# Patient Record
Sex: FEMALE | Race: WHITE | NOT HISPANIC OR LATINO
[De-identification: names, ages, dates, MRNs, and addresses within clinical notes are randomized per-mention and may not be internally consistent; named-entity substitution may affect disease eponyms.]

---

## 2021-01-13 ENCOUNTER — APPOINTMENT (OUTPATIENT)
Dept: FAMILY MEDICINE | Facility: CLINIC | Age: 21
End: 2021-01-13
Payer: COMMERCIAL

## 2021-01-13 DIAGNOSIS — Z78.9 OTHER SPECIFIED HEALTH STATUS: ICD-10-CM

## 2021-01-13 LAB — CYTOLOGY CVX/VAG DOC THIN PREP: NORMAL

## 2021-01-13 PROCEDURE — 99203 OFFICE O/P NEW LOW 30 MIN: CPT | Mod: 95

## 2021-01-13 RX ORDER — NORETHINDRONE ACETATE AND ETHINYL ESTRADIOL, ETHINYL ESTRADIOL AND FERROUS FUMARATE 1MG-10(24)
1 MG-10 MCG / KIT ORAL DAILY
Refills: 0 | Status: ACTIVE | COMMUNITY
Start: 2021-01-13

## 2021-01-13 NOTE — PLAN
[FreeTextEntry1] : \par est care\par labs to be done at lab for cpe\par covid ab desired\par desires std/hiv/ hepc testing\par \par cold sores\par herpes abs- hsv vs canker sores?\par warm salt water gargles\par advised if she has herpes she can spread to others lips / mouth through kissing and genital area through oral sex \par trial of valtrex next time it occurs\par \par intermittent rash fungal vs dermatitis?\par trial of clotrimaozle- betamethasone cream bid x 2 weeks maximum\par avoid new soaps/lotions/detergents/ deodorants\par \par office will mail lab script \par will f/u for cpe\par \par f/u 3 days if no relief, pt agreed w/plan\par \par

## 2021-01-13 NOTE — HISTORY OF PRESENT ILLNESS
[Home] : at home, [unfilled] , at the time of the visit. [Other Location: e.g. Home (Enter Location, City,State)___] : at [unfilled] [Verbal consent obtained from patient] : the patient, [unfilled] [FreeTextEntry1] : cold sores and rash and est care  [de-identified] : 21yo F presents to [facility], c/o cold sores and intermittent rash\par \par she was exposed to 2 people with covid and she was negative after 4th day of being exposed and will get tested again on friday on day 7\par she was exposed last friday\par \par \par c/o cold sores on tongue but not on lips or inside of cheeks\par she said this is intermittent and began in december recently but the first time it occurred was dec 2019 \par she said it usually happens during stressful times but this time they are lasting longer \par denies discharge or bleeding from sores\par \par since march she has an intermittent rash under her arm pits\par it is itching and burning and worse after a long day\par rash is bumpy and red and she has nothing \par she changed to an unscented natural deodorant but it didn’t change anything \par denies new lotions, detergents or soaps\par she used to use a wash cloth to clean herself but now she uses just a bar of soap bc rough materials make it worse she says\par when it first occurred she used her boyfriends deodorant and she never used it again and that is when it first started \par

## 2021-01-13 NOTE — PHYSICAL EXAM
[Normal] : no acute distress, well nourished, well developed and well-appearing [Normal Sclera/Conjunctiva] : normal sclera/conjunctiva [EOMI] : extraocular movements intact [Normal Outer Ear/Nose] : the outer ears and nose were normal in appearance [Normal Oropharynx] : the oropharynx was normal [No Rash] : no rash [No Focal Deficits] : no focal deficits [Normal Affect] : the affect was normal [Normal Mood] : the mood was normal [Normal Insight/Judgement] : insight and judgment were intact [de-identified] : no swelling in neck as per patient palpating [de-identified] : no tongue lesions or lip/mouth lesions  [de-identified] : normal chest rise with inhalation and chest fall with exhalation \par \par  [de-identified] : n

## 2021-01-13 NOTE — HEALTH RISK ASSESSMENT
[Patient reported PAP Smear was normal] : Patient reported PAP Smear was normal [HIV Test offered] : HIV Test offered [Hepatitis C test offered] : Hepatitis C test offered [PapSmearDate] : 8/2020 [PapSmearComments] : [facility] gynecology in [place]

## 2021-01-13 NOTE — REVIEW OF SYSTEMS
[Fever] : no fever [Chills] : no chills [Chest Pain] : no chest pain [Palpitations] : no palpitations [Shortness Of Breath] : no shortness of breath [Cough] : no cough [de-identified] : intermittent rash and itching and cold sores on tongue

## 2021-02-10 ENCOUNTER — TRANSCRIPTION ENCOUNTER (OUTPATIENT)
Age: 21
End: 2021-02-10

## 2021-02-12 ENCOUNTER — TRANSCRIPTION ENCOUNTER (OUTPATIENT)
Age: 21
End: 2021-02-12

## 2021-02-13 ENCOUNTER — TRANSCRIPTION ENCOUNTER (OUTPATIENT)
Age: 21
End: 2021-02-13

## 2021-02-16 ENCOUNTER — TRANSCRIPTION ENCOUNTER (OUTPATIENT)
Age: 21
End: 2021-02-16

## 2021-02-16 DIAGNOSIS — Z11.59 ENCOUNTER FOR SCREENING FOR OTHER VIRAL DISEASES: ICD-10-CM

## 2021-02-16 DIAGNOSIS — Z11.4 ENCOUNTER FOR SCREENING FOR HUMAN IMMUNODEFICIENCY VIRUS [HIV]: ICD-10-CM

## 2021-02-16 DIAGNOSIS — Z11.3 ENCOUNTER FOR SCREENING FOR INFECTIONS WITH A PREDOMINANTLY SEXUAL MODE OF TRANSMISSION: ICD-10-CM

## 2021-02-16 DIAGNOSIS — Z13.1 ENCOUNTER FOR SCREENING FOR DIABETES MELLITUS: ICD-10-CM

## 2021-02-16 DIAGNOSIS — Z01.83 ENCOUNTER FOR BLOOD TYPING: ICD-10-CM

## 2021-02-16 DIAGNOSIS — Z13.228 ENCOUNTER FOR SCREENING FOR OTHER METABOLIC DISORDERS: ICD-10-CM

## 2021-02-16 DIAGNOSIS — Z13.220 ENCOUNTER FOR SCREENING FOR LIPOID DISORDERS: ICD-10-CM

## 2021-02-16 DIAGNOSIS — Z76.89 PERSONS ENCOUNTERING HEALTH SERVICES IN OTHER SPECIFIED CIRCUMSTANCES: ICD-10-CM

## 2021-06-29 ENCOUNTER — APPOINTMENT (OUTPATIENT)
Dept: FAMILY MEDICINE | Facility: CLINIC | Age: 21
End: 2021-06-29
Payer: COMMERCIAL

## 2021-06-29 VITALS
BODY MASS INDEX: 27.6 KG/M2 | SYSTOLIC BLOOD PRESSURE: 116 MMHG | OXYGEN SATURATION: 98 % | DIASTOLIC BLOOD PRESSURE: 80 MMHG | RESPIRATION RATE: 12 BRPM | HEART RATE: 62 BPM | HEIGHT: 62 IN | WEIGHT: 150 LBS

## 2021-06-29 VITALS — TEMPERATURE: 98.3 F

## 2021-06-29 DIAGNOSIS — R21 RASH AND OTHER NONSPECIFIC SKIN ERUPTION: ICD-10-CM

## 2021-06-29 DIAGNOSIS — Z86.19 PERSONAL HISTORY OF OTHER INFECTIOUS AND PARASITIC DISEASES: ICD-10-CM

## 2021-06-29 PROCEDURE — G0444 DEPRESSION SCREEN ANNUAL: CPT | Mod: 59

## 2021-06-29 PROCEDURE — G0442 ANNUAL ALCOHOL SCREEN 15 MIN: CPT

## 2021-06-29 PROCEDURE — 99213 OFFICE O/P EST LOW 20 MIN: CPT | Mod: 25

## 2021-06-29 RX ORDER — CLOTRIMAZOLE AND BETAMETHASONE DIPROPIONATE 10; .5 MG/G; MG/G
1-0.05 CREAM TOPICAL TWICE DAILY
Qty: 1 | Refills: 1 | Status: DISCONTINUED | COMMUNITY
Start: 2021-01-13 | End: 2021-06-29

## 2021-06-29 RX ORDER — MULTIVITAMIN
TABLET ORAL
Refills: 0 | Status: ACTIVE | COMMUNITY

## 2021-06-29 NOTE — PLAN
[FreeTextEntry1] : she was advised to use the chlorhexidine rinse and to see her dentist for a cleaning and to follow up with the orthodontist to see if her retainer needs to be adjusted, the lab report from February was reviewed

## 2021-06-29 NOTE — PHYSICAL EXAM
[No Acute Distress] : no acute distress [Well Nourished] : well nourished [Well Developed] : well developed [Well-Appearing] : well-appearing [Normal Voice/Communication] : normal voice/communication [No Respiratory Distress] : no respiratory distress  [Normal Mood] : the mood was normal [Coordination Grossly Intact] : coordination grossly intact [de-identified] : erythema on buccal surface of gums behind teeth on the sides, no other lesions seen

## 2021-06-29 NOTE — HISTORY OF PRESENT ILLNESS
[FreeTextEntry8] : pt c/o blisters in mouth x on and off  1 year.  For the past year or so she has recurring sores inside of her mouth especially on the roof of her mouth and behind her teeth.  She doesn't get any sores or blisters on her lips.  She saw the oral surgeon and at that time she had paresh funez and she needed her wisdom teeth removed urgently.  They prescribed a numbing mouthwash that she uses now and then.  She hasn't had any unusual weight loss.  She has tried valtrex without improvement. She does wear a retainer nightly, she cleans it often

## 2022-01-20 ENCOUNTER — APPOINTMENT (OUTPATIENT)
Dept: FAMILY MEDICINE | Facility: CLINIC | Age: 22
End: 2022-01-20
Payer: COMMERCIAL

## 2022-01-20 VITALS
TEMPERATURE: 97.1 F | OXYGEN SATURATION: 98 % | DIASTOLIC BLOOD PRESSURE: 70 MMHG | RESPIRATION RATE: 12 BRPM | WEIGHT: 150 LBS | SYSTOLIC BLOOD PRESSURE: 108 MMHG | BODY MASS INDEX: 27.6 KG/M2 | HEART RATE: 72 BPM | HEIGHT: 62 IN

## 2022-01-20 DIAGNOSIS — Z13.220 ENCOUNTER FOR SCREENING FOR LIPOID DISORDERS: ICD-10-CM

## 2022-01-20 DIAGNOSIS — Z13.29 ENCOUNTER FOR SCREENING FOR OTHER SUSPECTED ENDOCRINE DISORDER: ICD-10-CM

## 2022-01-20 DIAGNOSIS — Z13.0 ENCOUNTER FOR SCREENING FOR DISEASES OF THE BLOOD AND BLOOD-FORMING ORGANS AND CERTAIN DISORDERS INVOLVING THE IMMUNE MECHANISM: ICD-10-CM

## 2022-01-20 LAB
BILIRUB UR QL STRIP: NEGATIVE
GLUCOSE UR-MCNC: NORMAL
HCG UR QL: 0.2 EU/DL
HCG UR QL: NEGATIVE
HGB UR QL STRIP.AUTO: NEGATIVE
KETONES UR-MCNC: NEGATIVE
LEUKOCYTE ESTERASE UR QL STRIP: NEGATIVE
NITRITE UR QL STRIP: NEGATIVE
PH UR STRIP: 8
PROT UR STRIP-MCNC: NEGATIVE
QUALITY CONTROL: YES
SP GR UR STRIP: 1.01

## 2022-01-20 PROCEDURE — 81025 URINE PREGNANCY TEST: CPT

## 2022-01-20 PROCEDURE — 99395 PREV VISIT EST AGE 18-39: CPT | Mod: 25

## 2022-01-20 PROCEDURE — G0444 DEPRESSION SCREEN ANNUAL: CPT | Mod: 59

## 2022-01-20 PROCEDURE — 81003 URINALYSIS AUTO W/O SCOPE: CPT | Mod: QW

## 2022-01-20 PROCEDURE — 36415 COLL VENOUS BLD VENIPUNCTURE: CPT

## 2022-01-20 NOTE — PLAN
[FreeTextEntry1] : 21 yr old female CPE \par \par 1. Nausea: \par in office u/a normal \par in office preg test neg \par given script for abd US due to c/o and abdominal discomfort \par \par Normal physical examination and vital signs \par continue all medications as directed \par f/u with GYN as directed \par \par Routine lab work today to screen for anemia, CAD, liver and kidney abnormalities, and thyroid disorders (CBC, CMP, lipid, TSH)\par \par RTO as routine for follow up.\par

## 2022-01-20 NOTE — COUNSELING
[Engage in a relaxing activity] : Engage in a relaxing activity [Plan in advance] : Plan in advance [Encouraged to increase physical activity] : Encouraged to increase physical activity Health Care Proxy (HCP)

## 2022-01-20 NOTE — HISTORY OF PRESENT ILLNESS
[FreeTextEntry1] : pt presents for cpe  [de-identified] : 21 yr old female is here for physical examination. Admits to 2 months of feeling nauseous. She took Plan B in November. Denies fever, chills, vomiting.

## 2022-01-20 NOTE — REVIEW OF SYSTEMS
[Abdominal Pain] : no abdominal pain [Nausea] : nausea [Constipation] : no constipation [Diarrhea] : diarrhea [Vomiting] : no vomiting [Heartburn] : no heartburn [Melena] : no melena [Dysuria] : no dysuria [Incontinence] : no incontinence [Nocturia] : no nocturia [Poor Libido] : libido not poor [Hematuria] : no hematuria [Frequency] : no frequency [Vaginal Discharge] : no vaginal discharge [Dysmenorrhea] : no dysmenorrhea [Negative] : Heme/Lymph [FreeTextEntry8] : suprapubic pain

## 2022-01-20 NOTE — HEALTH RISK ASSESSMENT
[Excellent] : ~his/her~  mood as  excellent [Never] : Never [No] : In the past 12 months have you used drugs other than those required for medical reasons? No [No falls in past year] : Patient reported no falls in the past year [1] : 2) Feeling down, depressed, or hopeless for several days (1) [PHQ-2 Negative - No further assessment needed] : PHQ-2 Negative - No further assessment needed [Patient reported PAP Smear was normal] : Patient reported PAP Smear was normal [None] : None [With Family] : lives with family [Employed] : employed [College] : College [Sexually Active] : sexually active [Feels Safe at Home] : Feels safe at home [Fully functional (bathing, dressing, toileting, transferring, walking, feeding)] : Fully functional (bathing, dressing, toileting, transferring, walking, feeding) [Fully functional (using the telephone, shopping, preparing meals, housekeeping, doing laundry, using] : Fully functional and needs no help or supervision to perform IADLs (using the telephone, shopping, preparing meals, housekeeping, doing laundry, using transportation, managing medications and managing finances) [Smoke Detector] : smoke detector [Carbon Monoxide Detector] : carbon monoxide detector [Seat Belt] :  uses seat belt [Sunscreen] : uses sunscreen [FreeTextEntry1] : nauseous   [de-identified] : none [de-identified] : GYN  [de-identified] : cardio  [de-identified] : regular  [[id number]] : 2 [Change in mental status noted] : No change in mental status noted [Reports changes in hearing] : Reports no changes in hearing [Reports changes in vision] : Reports no changes in vision [Reports normal functional visual acuity (ie: able to read med bottle)] : Reports poor functional visual acuity.  [Reports changes in dental health] : Reports no changes in dental health [PapSmearDate] : 2020 [AdvancecareDate] : 1/20/22

## 2022-01-20 NOTE — PHYSICAL EXAM
[No Acute Distress] : no acute distress [Well Nourished] : well nourished [PERRL] : pupils equal round and reactive to light [EOMI] : extraocular movements intact [Normal Outer Ear/Nose] : the outer ears and nose were normal in appearance [Normal TMs] : both tympanic membranes were normal [No JVD] : no jugular venous distention [No Lymphadenopathy] : no lymphadenopathy [Supple] : supple [Thyroid Normal, No Nodules] : the thyroid was normal and there were no nodules present [No Respiratory Distress] : no respiratory distress  [No Accessory Muscle Use] : no accessory muscle use [Clear to Auscultation] : lungs were clear to auscultation bilaterally [Normal Rate] : normal rate  [Regular Rhythm] : with a regular rhythm [Normal S1, S2] : normal S1 and S2 [No Murmur] : no murmur heard [No Carotid Bruits] : no carotid bruits [No Edema] : there was no peripheral edema [No Extremity Clubbing/Cyanosis] : no extremity clubbing/cyanosis [Soft] : abdomen soft [Non Tender] : non-tender [Non-distended] : non-distended [No HSM] : no HSM [Normal Bowel Sounds] : normal bowel sounds [Normal Posterior Cervical Nodes] : no posterior cervical lymphadenopathy [Normal Anterior Cervical Nodes] : no anterior cervical lymphadenopathy [No Spinal Tenderness] : no spinal tenderness [Kyphosis] : no kyphosis [Scoliosis] : no scoliosis [No Joint Swelling] : no joint swelling [Grossly Normal Strength/Tone] : grossly normal strength/tone [No Rash] : no rash [No Skin Lesions] : no skin lesions [Acne] : no acne [Coordination Grossly Intact] : coordination grossly intact [No Focal Deficits] : no focal deficits [Normal Gait] : normal gait [Deep Tendon Reflexes (DTR)] : deep tendon reflexes were 2+ and symmetric [Normal Affect] : the affect was normal [Alert and Oriented x3] : oriented to person, place, and time [Normal Insight/Judgement] : insight and judgment were intact

## 2022-01-21 LAB
ALBUMIN SERPL ELPH-MCNC: 4.9 G/DL
ALP BLD-CCNC: 43 U/L
ALT SERPL-CCNC: 17 U/L
ANION GAP SERPL CALC-SCNC: 14 MMOL/L
AST SERPL-CCNC: 22 U/L
BASOPHILS # BLD AUTO: 0.06 K/UL
BASOPHILS NFR BLD AUTO: 0.9 %
BILIRUB SERPL-MCNC: 0.3 MG/DL
BUN SERPL-MCNC: 16 MG/DL
CALCIUM SERPL-MCNC: 10 MG/DL
CHLORIDE SERPL-SCNC: 102 MMOL/L
CHOLEST SERPL-MCNC: 192 MG/DL
CO2 SERPL-SCNC: 24 MMOL/L
CREAT SERPL-MCNC: 1.06 MG/DL
EOSINOPHIL # BLD AUTO: 0.06 K/UL
EOSINOPHIL NFR BLD AUTO: 0.9 %
GLUCOSE SERPL-MCNC: 94 MG/DL
HCG SERPL-MCNC: <1 MIU/ML
HCT VFR BLD CALC: 40.5 %
HDLC SERPL-MCNC: 78 MG/DL
HGB BLD-MCNC: 13.7 G/DL
IMM GRANULOCYTES NFR BLD AUTO: 0.1 %
LDLC SERPL CALC-MCNC: 79 MG/DL
LYMPHOCYTES # BLD AUTO: 2.38 K/UL
LYMPHOCYTES NFR BLD AUTO: 34.2 %
MAN DIFF?: NORMAL
MCHC RBC-ENTMCNC: 33.2 PG
MCHC RBC-ENTMCNC: 33.8 GM/DL
MCV RBC AUTO: 98.1 FL
MONOCYTES # BLD AUTO: 0.49 K/UL
MONOCYTES NFR BLD AUTO: 7 %
NEUTROPHILS # BLD AUTO: 3.96 K/UL
NEUTROPHILS NFR BLD AUTO: 56.9 %
NONHDLC SERPL-MCNC: 115 MG/DL
PLATELET # BLD AUTO: 310 K/UL
POTASSIUM SERPL-SCNC: 4.6 MMOL/L
PROT SERPL-MCNC: 7.2 G/DL
RBC # BLD: 4.13 M/UL
RBC # FLD: 12.2 %
SODIUM SERPL-SCNC: 140 MMOL/L
T3FREE SERPL-MCNC: 3.35 PG/ML
T4 FREE SERPL-MCNC: 1.3 NG/DL
TRIGL SERPL-MCNC: 179 MG/DL
TSH SERPL-ACNC: 1.49 UIU/ML
WBC # FLD AUTO: 6.96 K/UL

## 2022-01-25 ENCOUNTER — TRANSCRIPTION ENCOUNTER (OUTPATIENT)
Age: 22
End: 2022-01-25

## 2022-01-25 LAB
AMYLASE/CREAT SERPL: 70 U/L
ESTIMATED AVERAGE GLUCOSE: 97 MG/DL
HBA1C MFR BLD HPLC: 5 %
LPL SERPL-CCNC: 26 U/L

## 2022-04-11 PROBLEM — Z11.59 SCREENING FOR VIRAL DISEASE: Status: RESOLVED | Noted: 2021-01-13 | Resolved: 2021-02-16

## 2022-04-14 ENCOUNTER — APPOINTMENT (OUTPATIENT)
Dept: FAMILY MEDICINE | Facility: CLINIC | Age: 22
End: 2022-04-14
Payer: COMMERCIAL

## 2022-04-14 ENCOUNTER — NON-APPOINTMENT (OUTPATIENT)
Age: 22
End: 2022-04-14

## 2022-04-14 VITALS
WEIGHT: 169 LBS | HEART RATE: 79 BPM | HEIGHT: 62 IN | DIASTOLIC BLOOD PRESSURE: 80 MMHG | SYSTOLIC BLOOD PRESSURE: 108 MMHG | TEMPERATURE: 97.4 F | RESPIRATION RATE: 15 BRPM | OXYGEN SATURATION: 97 % | BODY MASS INDEX: 31.1 KG/M2

## 2022-04-14 DIAGNOSIS — Z23 ENCOUNTER FOR IMMUNIZATION: ICD-10-CM

## 2022-04-14 DIAGNOSIS — R00.0 TACHYCARDIA, UNSPECIFIED: ICD-10-CM

## 2022-04-14 PROCEDURE — 99215 OFFICE O/P EST HI 40 MIN: CPT | Mod: 25

## 2022-04-14 PROCEDURE — 93000 ELECTROCARDIOGRAM COMPLETE: CPT

## 2022-04-14 NOTE — HISTORY OF PRESENT ILLNESS
[FreeTextEntry8] : Patient presents for feeling chest pain and feels heart rate has been elevated symptoms started Sunday states it has been more often. Pt states she goes to the gym 6 days a week. Also has felt severe tenderness in both breasts and has not felt right x 5 months. She was seen by pediatric cardiologist years ago and wore Holter monitor, work up was negative. Denies changes in vision, dizziness, chest pain, palpitations and lower extremity edema.\par \par \par

## 2022-04-14 NOTE — PLAN
[FreeTextEntry1] : 21 yr old female acute visit \par \par Chest discomfort: \par likely muscle related secondary to working out \par ECG NSR \par due to c/o elevated HR while working out will refer to cardio for further evaluation \par advised to refrain from caffeine, energy drinks and activity as tolerated \par continue all medications as directed \par healthy diet and physical activity as tolerated\par report to ER if sustained elevated HR along with chest pain \par RTO as routine for follow up.\par

## 2022-04-14 NOTE — REVIEW OF SYSTEMS
[Joint Pain] : no joint pain [Joint Stiffness] : no joint stiffness [Joint Swelling] : no joint swelling [Muscle Weakness] : no muscle weakness [Muscle Pain] : muscle pain [Back Pain] : no back pain [Negative] : Heme/Lymph [FreeTextEntry5] : c [FreeTextEntry9] : left chest wall tenderness

## 2022-04-14 NOTE — PHYSICAL EXAM
[No Acute Distress] : no acute distress [Normal Sclera/Conjunctiva] : normal sclera/conjunctiva [Normal Outer Ear/Nose] : the outer ears and nose were normal in appearance [No JVD] : no jugular venous distention [No Respiratory Distress] : no respiratory distress  [No Accessory Muscle Use] : no accessory muscle use [Clear to Auscultation] : lungs were clear to auscultation bilaterally [Normal Rate] : normal rate  [Regular Rhythm] : with a regular rhythm [Normal S1, S2] : normal S1 and S2 [No Murmur] : no murmur heard [No Extremity Clubbing/Cyanosis] : no extremity clubbing/cyanosis [No CVA Tenderness] : no CVA  tenderness [No Spinal Tenderness] : no spinal tenderness [Kyphosis] : no kyphosis [Scoliosis] : no scoliosis [Grossly Normal Strength/Tone] : grossly normal strength/tone [Normal Gait] : normal gait [Normal Affect] : the affect was normal [Alert and Oriented x3] : oriented to person, place, and time

## 2022-06-14 DIAGNOSIS — Z11.1 ENCOUNTER FOR SCREENING FOR RESPIRATORY TUBERCULOSIS: ICD-10-CM

## 2022-06-14 DIAGNOSIS — Z78.9 OTHER SPECIFIED HEALTH STATUS: ICD-10-CM

## 2022-08-24 ENCOUNTER — APPOINTMENT (OUTPATIENT)
Dept: FAMILY MEDICINE | Facility: CLINIC | Age: 22
End: 2022-08-24

## 2022-08-24 PROCEDURE — 99214 OFFICE O/P EST MOD 30 MIN: CPT | Mod: 95

## 2022-08-24 NOTE — PLAN
[FreeTextEntry1] : Discussed ways to improve focusing on grad studies \par referral for psych to r/o ADHD \par recent labs normal limits \par \par RTO as routine for follow up.\par

## 2022-08-24 NOTE — HISTORY OF PRESENT ILLNESS
[Home] : at home, [unfilled] , at the time of the visit. 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 She is having issues with concentration. She felt this last year with school.  She feels like studying is taking longer than normal.   She is not able to focus. She is working out 5 days a week and  eating a healthy diet. Denies suicidal thoughts, anxiety and depression. \par \par \par

## 2022-08-24 NOTE — REVIEW OF SYSTEMS
[Suicidal] : not suicidal [Insomnia] : no insomnia [Anxiety] : no anxiety [Depression] : no depression [Negative] : Heme/Lymph [de-identified] : difficulty concentrating

## 2022-08-24 NOTE — PHYSICAL EXAM
[No Acute Distress] : no acute distress [Normal Sclera/Conjunctiva] : normal sclera/conjunctiva [Normal Outer Ear/Nose] : the outer ears and nose were normal in appearance [No JVD] : no jugular venous distention [No Respiratory Distress] : no respiratory distress  [Normal Rate] : normal rate  [Normal Gait] : normal gait [Normal Affect] : the affect was normal [Alert and Oriented x3] : oriented to person, place, and time

## 2022-11-04 RX ORDER — CHLORHEXIDINE GLUCONATE, 0.12% ORAL RINSE 1.2 MG/ML
0.12 SOLUTION DENTAL
Qty: 1 | Refills: 5 | Status: ACTIVE | COMMUNITY
Start: 2021-06-29 | End: 1900-01-01

## 2022-12-15 ENCOUNTER — APPOINTMENT (OUTPATIENT)
Dept: NEUROLOGY | Facility: CLINIC | Age: 22
End: 2022-12-15

## 2022-12-15 VITALS
DIASTOLIC BLOOD PRESSURE: 70 MMHG | HEIGHT: 62 IN | HEART RATE: 80 BPM | SYSTOLIC BLOOD PRESSURE: 114 MMHG | BODY MASS INDEX: 29.44 KG/M2 | WEIGHT: 160 LBS

## 2022-12-15 PROCEDURE — 99204 OFFICE O/P NEW MOD 45 MIN: CPT

## 2022-12-15 NOTE — CONSULT LETTER
[Consult Letter:] : I had the pleasure of evaluating your patient, [unfilled]. [Please see my note below.] : Please see my note below. [Consult Closing:] : Thank you very much for allowing me to participate in the care of this patient.  If you have any questions, please do not hesitate to contact me. [Sincerely,] : Sincerely, [Dear  ___] : Dear  [unfilled], [FreeTextEntry2] : Dmitriy Kaminski, DO

## 2022-12-15 NOTE — PHYSICAL EXAM
[FreeTextEntry1] : No cognitive or communication deficits.  Cranial nerves intact.  No focal sensorimotor deficits.  Gait and coordination intact.  Heart sounds are normal.  No murmurs heard.

## 2022-12-15 NOTE — HISTORY OF PRESENT ILLNESS
[FreeTextEntry1] : 22-year-old woman provides a history of "trouble focusing" for the past 2 years.  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  She denies any significant health problems in the past.\par \par When questioning her family history she believes her mother may have symptoms suggestive of attention deficit disorder.\par \par I had patient complete the Adult ADHD Self-Report Scale (ASRS-version 1.1) Symptom Checklist and her responses met the criteria for adult ADHD.  Some of her responses on the checklist included: Very often having problems to remember appointments and obligations.  Very often procrastinates.  Often fidgets.  Very often feels overly active and compelled to do things like she is being driven by a motor.  She very often misplaces things and has difficulty finding things at home or at work.  She is often distracted by activity or noise around her.  She very often feels restless and fidgety.  She also admits to very often finding herself talking too much in social situations.

## 2022-12-15 NOTE — ASSESSMENT
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  She will start methylphenidate 10 mg before breakfast and lunch.  She will contact me if she notes any adverse effects.  She will schedule a telehealth follow-up visit in 4 weeks.

## 2023-03-09 ENCOUNTER — NON-APPOINTMENT (OUTPATIENT)
Age: 23
End: 2023-03-09

## 2023-03-09 ENCOUNTER — APPOINTMENT (OUTPATIENT)
Dept: NEUROLOGY | Facility: CLINIC | Age: 23
End: 2023-03-09
Payer: COMMERCIAL

## 2023-03-09 PROCEDURE — 99212 OFFICE O/P EST SF 10 MIN: CPT | Mod: 95

## 2023-03-11 NOTE — REASON FOR VISIT
[Other Location: e.g. School (Enter Location, City,State)___] : at [unfilled], at the time of the visit. [Medical Office: ([facility])___] : at the medical office located in  [Patient] : the patient [Follow-Up: _____] : a [unfilled] follow-up visit

## 2023-03-11 NOTE — END OF VISIT
Addendum  created 01/19/21 1136 by Jacqueline Beltrán MD    Clinical Note Signed       [Time Spent: ___ minutes] : I have spent [unfilled] minutes of time on the encounter.

## 2023-03-11 NOTE — HISTORY OF PRESENT ILLNESS
[FreeTextEntry1] : 22-year-old woman diagnosed with ADHD reports good response to methylphenidate 10 mg twice daily.  Her pharmacy in [place] is currently out of stock with methylphenidate.  I told her to get back to me if any other pharmacies in the vicinity have the medication or similar substitutes.\par \par She was told that I am leaving [facility] in 3 weeks and she can follow-up with either my colleagues on telehealth while she is at school or to find a neurologist in the [place] area.

## 2023-05-18 ENCOUNTER — APPOINTMENT (OUTPATIENT)
Dept: NEUROLOGY | Facility: CLINIC | Age: 23
End: 2023-05-18

## 2023-05-31 ENCOUNTER — TRANSCRIPTION ENCOUNTER (OUTPATIENT)
Age: 23
End: 2023-05-31

## 2023-07-27 ENCOUNTER — APPOINTMENT (OUTPATIENT)
Dept: FAMILY MEDICINE | Facility: CLINIC | Age: 23
End: 2023-07-27
Payer: COMMERCIAL

## 2023-07-27 ENCOUNTER — NON-APPOINTMENT (OUTPATIENT)
Age: 23
End: 2023-07-27

## 2023-07-27 VITALS
RESPIRATION RATE: 15 BRPM | WEIGHT: 159 LBS | TEMPERATURE: 97.6 F | SYSTOLIC BLOOD PRESSURE: 102 MMHG | OXYGEN SATURATION: 98 % | DIASTOLIC BLOOD PRESSURE: 70 MMHG | HEART RATE: 84 BPM | BODY MASS INDEX: 29.26 KG/M2 | HEIGHT: 62 IN

## 2023-07-27 DIAGNOSIS — Z00.00 ENCOUNTER FOR GENERAL ADULT MEDICAL EXAMINATION W/OUT ABNORMAL FINDINGS: ICD-10-CM

## 2023-07-27 DIAGNOSIS — R07.89 OTHER CHEST PAIN: ICD-10-CM

## 2023-07-27 DIAGNOSIS — R10.9 UNSPECIFIED ABDOMINAL PAIN: ICD-10-CM

## 2023-07-27 DIAGNOSIS — Z87.898 PERSONAL HISTORY OF OTHER SPECIFIED CONDITIONS: ICD-10-CM

## 2023-07-27 DIAGNOSIS — Z87.19 PERSONAL HISTORY OF OTHER DISEASES OF THE DIGESTIVE SYSTEM: ICD-10-CM

## 2023-07-27 PROCEDURE — 99212 OFFICE O/P EST SF 10 MIN: CPT | Mod: 25

## 2023-07-27 PROCEDURE — 99395 PREV VISIT EST AGE 18-39: CPT | Mod: 25

## 2023-07-27 PROCEDURE — 93000 ELECTROCARDIOGRAM COMPLETE: CPT

## 2023-07-27 NOTE — HISTORY OF PRESENT ILLNESS
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 She is doing well and has no acute medical complaints. Denies changes in vision, dizziness, chest pain, palpitations. \par

## 2023-07-27 NOTE — PHYSICAL EXAM
[No Acute Distress] : no acute distress [Normal Sclera/Conjunctiva] : normal sclera/conjunctiva [PERRL] : pupils equal round and reactive to light [EOMI] : extraocular movements intact [Normal Outer Ear/Nose] : the outer ears and nose were normal in appearance [Normal Oropharynx] : the oropharynx was normal [No JVD] : no jugular venous distention [No Lymphadenopathy] : no lymphadenopathy [Supple] : supple [No Respiratory Distress] : no respiratory distress  [No Accessory Muscle Use] : no accessory muscle use [Clear to Auscultation] : lungs were clear to auscultation bilaterally [Normal Rate] : normal rate  [Regular Rhythm] : with a regular rhythm [Normal S1, S2] : normal S1 and S2 [No Murmur] : no murmur heard [No Carotid Bruits] : no carotid bruits [Pedal Pulses Present] : the pedal pulses are present [No Extremity Clubbing/Cyanosis] : no extremity clubbing/cyanosis [Soft] : abdomen soft [Non Tender] : non-tender [Non-distended] : non-distended [Normal Bowel Sounds] : normal bowel sounds [Grossly Normal Strength/Tone] : grossly normal strength/tone [No Focal Deficits] : no focal deficits [Normal Gait] : normal gait [Normal Affect] : the affect was normal [Alert and Oriented x3] : oriented to person, place, and time [Kyphosis] : no kyphosis [Scoliosis] : no scoliosis

## 2023-07-27 NOTE — HEALTH RISK ASSESSMENT
[Excellent] : ~his/her~  mood as  excellent [No] : In the past 12 months have you used drugs other than those required for medical reasons? No [0] : 2) Feeling down, depressed, or hopeless: Not at all (0) [PHQ-2 Negative - No further assessment needed] : PHQ-2 Negative - No further assessment needed [Patient reported PAP Smear was normal] : Patient reported PAP Smear was normal [HIV test declined] : HIV test declined [Hepatitis C test declined] : Hepatitis C test declined [None] : None [With Family] : lives with family [Graduate School] : graduate school [Feels Safe at Home] : Feels safe at home [Fully functional (bathing, dressing, toileting, transferring, walking, feeding)] : Fully functional (bathing, dressing, toileting, transferring, walking, feeding) [Fully functional (using the telephone, shopping, preparing meals, housekeeping, doing laundry, using] : Fully functional and needs no help or supervision to perform IADLs (using the telephone, shopping, preparing meals, housekeeping, doing laundry, using transportation, managing medications and managing finances) [Smoke Detector] : smoke detector [Carbon Monoxide Detector] : carbon monoxide detector [Seat Belt] :  uses seat belt [Sunscreen] : uses sunscreen [Never] : Never [FreeTextEntry1] : none  [de-identified] : no  [de-identified] : GYN, neuro  [[id number]] : 0 [Change in mental status noted] : No change in mental status noted [Reports changes in hearing] : Reports no changes in hearing [Reports changes in vision] : Reports no changes in vision [Reports normal functional visual acuity (ie: able to read med bottle)] : Reports poor functional visual acuity.  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

## 2023-07-27 NOTE — PLAN
[FreeTextEntry1] : 22 yr old female CPE \par ECG NSR \par continue all medications as directed \par healthy diet and physical activity as tolerated\par pt is up to date with pap \par given routine lab script for school form completion \par I-STOP: methylphenidate refilled \par RTO as routine for follow up.\par

## 2023-10-27 ENCOUNTER — APPOINTMENT (OUTPATIENT)
Dept: FAMILY MEDICINE | Facility: CLINIC | Age: 23
End: 2023-10-27
Payer: COMMERCIAL

## 2023-10-27 PROCEDURE — 99212 OFFICE O/P EST SF 10 MIN: CPT | Mod: 95

## 2023-11-28 RX ORDER — VALACYCLOVIR 1 G/1
1 TABLET, FILM COATED ORAL
Qty: 4 | Refills: 2 | Status: ACTIVE | COMMUNITY
Start: 2021-01-13 | End: 1900-01-01

## 2024-02-28 ENCOUNTER — APPOINTMENT (OUTPATIENT)
Dept: FAMILY MEDICINE | Facility: CLINIC | Age: 24
End: 2024-02-28
Payer: COMMERCIAL

## 2024-02-28 PROCEDURE — 99213 OFFICE O/P EST LOW 20 MIN: CPT

## 2024-02-28 NOTE — PHYSICAL EXAM
[Normal Sclera/Conjunctiva] : normal sclera/conjunctiva [No Acute Distress] : no acute distress [No Respiratory Distress] : no respiratory distress  [Alert and Oriented x3] : oriented to person, place, and time

## 2024-02-29 NOTE — HISTORY OF PRESENT ILLNESS
[Home] : at home, [unfilled] , at the time of the visit. [Medical Office: ([facility])___] : at the medical office located in  [Verbal consent obtained from patient] : the patient, [unfilled] [FreeTextEntry1] : medication refill  [de-identified] : Verbal consent was obtained from patient for telehealth visit on 2/28/24  23F with ADHD was seen at [facility] Dr. Vargas. Dose of methylphenidate was increased. Denies headaches.

## 2024-02-29 NOTE — PLAN
[FreeTextEntry1] : 23F medication refill  ADHD:  methylphenidate dose 10 mg increase BID to dose 15 mg PO BID  I-STOP #: [id number] [facility]  continue all medications as directed  RTO as routine for follow up.

## 2024-04-15 ENCOUNTER — APPOINTMENT (OUTPATIENT)
Dept: FAMILY MEDICINE | Facility: CLINIC | Age: 24
End: 2024-04-15
Payer: COMMERCIAL

## 2024-04-15 PROCEDURE — 99213 OFFICE O/P EST LOW 20 MIN: CPT

## 2024-04-15 RX ORDER — METHYLPHENIDATE HYDROCHLORIDE 5 MG/1
5 TABLET ORAL TWICE DAILY
Qty: 60 | Refills: 0 | Status: DISCONTINUED | COMMUNITY
Start: 1900-01-01 | End: 2024-04-15

## 2024-04-15 NOTE — HISTORY OF PRESENT ILLNESS
[Home] : at home, [unfilled] , at the time of the visit. [Medical Office: ([facility])___] : at the medical office located in  [Verbal consent obtained from patient] : the patient, [unfilled] [FreeTextEntry1] : medication refill  [de-identified] : Verbal consent was obtained from patient for telehealth visit on 4/15/24  States she is well on current dose of methylphenidate. Denies headaches.

## 2024-06-10 RX ORDER — METHYLPHENIDATE HYDROCHLORIDE 10 MG/1
10 TABLET ORAL TWICE DAILY
Qty: 60 | Refills: 0 | Status: ACTIVE | COMMUNITY
Start: 2022-12-15 | End: 1900-01-01

## 2024-06-14 ENCOUNTER — NON-APPOINTMENT (OUTPATIENT)
Age: 24
End: 2024-06-14

## 2024-06-14 ENCOUNTER — APPOINTMENT (OUTPATIENT)
Dept: FAMILY MEDICINE | Facility: CLINIC | Age: 24
End: 2024-06-14
Payer: COMMERCIAL

## 2024-06-14 DIAGNOSIS — F90.9 ATTENTION-DEFICIT HYPERACTIVITY DISORDER, UNSPECIFIED TYPE: ICD-10-CM

## 2024-06-14 DIAGNOSIS — R41.840 ATTENTION AND CONCENTRATION DEFICIT: ICD-10-CM

## 2024-06-14 PROCEDURE — G2211 COMPLEX E/M VISIT ADD ON: CPT | Mod: NC

## 2024-06-14 PROCEDURE — 99441: CPT

## 2024-06-14 NOTE — HISTORY OF PRESENT ILLNESS
[Home] : at home, [unfilled] , at the time of the visit. [Medical Office: ([facility])___] : at the medical office located in  [FreeTextEntry1] : medication refill  [de-identified] : Verbal consent was obtained from patient for telephonic visit on 6/14/24  States she is well and is now in clinic. Denies side effects with medication.

## 2024-07-31 ENCOUNTER — APPOINTMENT (OUTPATIENT)
Dept: FAMILY MEDICINE | Facility: CLINIC | Age: 24
End: 2024-07-31
Payer: COMMERCIAL

## 2024-07-31 VITALS
BODY MASS INDEX: 25.58 KG/M2 | WEIGHT: 139 LBS | OXYGEN SATURATION: 96 % | HEART RATE: 72 BPM | TEMPERATURE: 98.1 F | RESPIRATION RATE: 15 BRPM | SYSTOLIC BLOOD PRESSURE: 96 MMHG | HEIGHT: 62 IN | DIASTOLIC BLOOD PRESSURE: 72 MMHG

## 2024-07-31 DIAGNOSIS — Z11.59 ENCOUNTER FOR SCREENING FOR OTHER VIRAL DISEASES: ICD-10-CM

## 2024-07-31 DIAGNOSIS — Z00.00 ENCOUNTER FOR GENERAL ADULT MEDICAL EXAMINATION W/OUT ABNORMAL FINDINGS: ICD-10-CM

## 2024-07-31 PROCEDURE — 36415 COLL VENOUS BLD VENIPUNCTURE: CPT

## 2024-07-31 PROCEDURE — 99395 PREV VISIT EST AGE 18-39: CPT

## 2024-08-01 NOTE — HISTORY OF PRESENT ILLNESS
[FreeTextEntry1] : Patient is here for annual physical.  [de-identified] : 23F is here for physical examination. No acute medical concerns. Denies depression.

## 2024-08-01 NOTE — HISTORY OF PRESENT ILLNESS
[FreeTextEntry1] : Patient is here for annual physical.  [de-identified] : 23F is here for physical examination. No acute medical concerns. Denies depression.

## 2024-08-01 NOTE — PHYSICAL EXAM
[No Acute Distress] : no acute distress [Normal Sclera/Conjunctiva] : normal sclera/conjunctiva [Normal Outer Ear/Nose] : the outer ears and nose were normal in appearance [Normal Oropharynx] : the oropharynx was normal [Normal TMs] : both tympanic membranes were normal [No JVD] : no jugular venous distention [No Lymphadenopathy] : no lymphadenopathy [Supple] : supple [Thyroid Normal, No Nodules] : the thyroid was normal and there were no nodules present [No Respiratory Distress] : no respiratory distress  [No Accessory Muscle Use] : no accessory muscle use [Clear to Auscultation] : lungs were clear to auscultation bilaterally [Normal Rate] : normal rate  [Regular Rhythm] : with a regular rhythm [No Murmur] : no murmur heard [No Carotid Bruits] : no carotid bruits [Pedal Pulses Present] : the pedal pulses are present [No Extremity Clubbing/Cyanosis] : no extremity clubbing/cyanosis [Soft] : abdomen soft [Non Tender] : non-tender [Non-distended] : non-distended [No HSM] : no HSM [Normal Bowel Sounds] : normal bowel sounds [Normal Gait] : normal gait [Alert and Oriented x3] : oriented to person, place, and time

## 2024-08-01 NOTE — PLAN
[FreeTextEntry1] : normal examination and vital signs  PHQ-9 negative  routine labs  continue all medications as directed  RTO as routine for follow up.

## 2024-08-05 LAB
ALBUMIN SERPL ELPH-MCNC: 5 G/DL
ALP BLD-CCNC: 53 U/L
ALT SERPL-CCNC: 14 U/L
ANION GAP SERPL CALC-SCNC: 11 MMOL/L
AST SERPL-CCNC: 21 U/L
BASOPHILS # BLD AUTO: 0.05 K/UL
BASOPHILS NFR BLD AUTO: 1 %
BILIRUB SERPL-MCNC: 0.7 MG/DL
BUN SERPL-MCNC: 16 MG/DL
CALCIUM SERPL-MCNC: 9.7 MG/DL
CHLORIDE SERPL-SCNC: 104 MMOL/L
CO2 SERPL-SCNC: 25 MMOL/L
CREAT SERPL-MCNC: 1.05 MG/DL
EGFR: 76 ML/MIN/1.73M2
EOSINOPHIL # BLD AUTO: 0.05 K/UL
EOSINOPHIL NFR BLD AUTO: 1 %
GLUCOSE SERPL-MCNC: 93 MG/DL
HBV SURFACE AB SER QL: NONREACTIVE
HCT VFR BLD CALC: 40.6 %
HGB BLD-MCNC: 13.9 G/DL
IMM GRANULOCYTES NFR BLD AUTO: 0.2 %
LYMPHOCYTES # BLD AUTO: 1.93 K/UL
LYMPHOCYTES NFR BLD AUTO: 40.1 %
M TB IFN-G BLD-IMP: NEGATIVE
MAN DIFF?: NORMAL
MCHC RBC-ENTMCNC: 32.4 PG
MCHC RBC-ENTMCNC: 34.2 GM/DL
MCV RBC AUTO: 94.6 FL
MONOCYTES # BLD AUTO: 0.39 K/UL
MONOCYTES NFR BLD AUTO: 8.1 %
NEUTROPHILS # BLD AUTO: 2.38 K/UL
NEUTROPHILS NFR BLD AUTO: 49.6 %
PLATELET # BLD AUTO: 294 K/UL
POTASSIUM SERPL-SCNC: 4.3 MMOL/L
PROT SERPL-MCNC: 7.2 G/DL
QUANTIFERON TB PLUS MITOGEN MINUS NIL: >10 IU/ML
QUANTIFERON TB PLUS NIL: 0.04 IU/ML
QUANTIFERON TB PLUS TB1 MINUS NIL: 0 IU/ML
QUANTIFERON TB PLUS TB2 MINUS NIL: 0 IU/ML
RBC # BLD: 4.29 M/UL
RBC # FLD: 11.9 %
SODIUM SERPL-SCNC: 140 MMOL/L
T4 FREE SERPL-MCNC: 1.2 NG/DL
TSH SERPL-ACNC: 1.37 UIU/ML
WBC # FLD AUTO: 4.81 K/UL

## 2024-10-14 ENCOUNTER — APPOINTMENT (OUTPATIENT)
Dept: FAMILY MEDICINE | Facility: CLINIC | Age: 24
End: 2024-10-14

## 2024-10-21 ENCOUNTER — APPOINTMENT (OUTPATIENT)
Dept: FAMILY MEDICINE | Facility: CLINIC | Age: 24
End: 2024-10-21
Payer: COMMERCIAL

## 2024-10-21 DIAGNOSIS — R41.840 ATTENTION AND CONCENTRATION DEFICIT: ICD-10-CM

## 2024-10-21 DIAGNOSIS — F90.9 ATTENTION-DEFICIT HYPERACTIVITY DISORDER, UNSPECIFIED TYPE: ICD-10-CM

## 2024-10-21 PROCEDURE — 99212 OFFICE O/P EST SF 10 MIN: CPT

## 2024-10-21 PROCEDURE — G2211 COMPLEX E/M VISIT ADD ON: CPT | Mod: NC

## 2024-10-21 RX ORDER — METHYLPHENIDATE HYDROCHLORIDE 5 MG/1
5 TABLET ORAL
Qty: 60 | Refills: 0 | Status: ACTIVE | COMMUNITY
Start: 2024-10-21 | End: 1900-01-01

## 2025-04-11 ENCOUNTER — NON-APPOINTMENT (OUTPATIENT)
Age: 25
End: 2025-04-11

## 2025-04-11 ENCOUNTER — APPOINTMENT (OUTPATIENT)
Dept: FAMILY MEDICINE | Facility: CLINIC | Age: 25
End: 2025-04-11
Payer: MEDICAID

## 2025-04-11 DIAGNOSIS — R41.840 ATTENTION AND CONCENTRATION DEFICIT: ICD-10-CM

## 2025-04-11 DIAGNOSIS — F90.9 ATTENTION-DEFICIT HYPERACTIVITY DISORDER, UNSPECIFIED TYPE: ICD-10-CM

## 2025-04-11 PROCEDURE — G2211 COMPLEX E/M VISIT ADD ON: CPT | Mod: NC,95

## 2025-04-11 PROCEDURE — 99212 OFFICE O/P EST SF 10 MIN: CPT | Mod: 95

## 2025-08-05 ENCOUNTER — APPOINTMENT (OUTPATIENT)
Dept: FAMILY MEDICINE | Facility: CLINIC | Age: 25
End: 2025-08-05
Payer: MEDICAID

## 2025-08-05 VITALS
WEIGHT: 165.13 LBS | HEART RATE: 68 BPM | DIASTOLIC BLOOD PRESSURE: 62 MMHG | OXYGEN SATURATION: 99 % | BODY MASS INDEX: 30.39 KG/M2 | TEMPERATURE: 98.4 F | HEIGHT: 62 IN | SYSTOLIC BLOOD PRESSURE: 110 MMHG | RESPIRATION RATE: 15 BRPM

## 2025-08-05 DIAGNOSIS — L25.9 UNSPECIFIED CONTACT DERMATITIS, UNSPECIFIED CAUSE: ICD-10-CM

## 2025-08-05 DIAGNOSIS — Z00.00 ENCOUNTER FOR GENERAL ADULT MEDICAL EXAMINATION W/OUT ABNORMAL FINDINGS: ICD-10-CM

## 2025-08-05 PROCEDURE — 99395 PREV VISIT EST AGE 18-39: CPT | Mod: 25

## 2025-08-05 RX ORDER — NYSTATIN 100000 [USP'U]/G
100000 POWDER TOPICAL
Qty: 1 | Refills: 0 | Status: ACTIVE | COMMUNITY
Start: 2025-08-05 | End: 1900-01-01

## 2025-08-08 LAB
ALBUMIN SERPL ELPH-MCNC: 4.7 G/DL
ALP BLD-CCNC: 45 U/L
ALT SERPL-CCNC: 13 U/L
ANION GAP SERPL CALC-SCNC: 13 MMOL/L
APPEARANCE: CLEAR
AST SERPL-CCNC: 20 U/L
BASOPHILS # BLD AUTO: 0.06 K/UL
BASOPHILS NFR BLD AUTO: 1 %
BILIRUB SERPL-MCNC: 0.6 MG/DL
BILIRUBIN URINE: NEGATIVE
BLOOD URINE: NEGATIVE
BUN SERPL-MCNC: 16 MG/DL
C TRACH RRNA SPEC QL NAA+PROBE: NOT DETECTED
CALCIUM SERPL-MCNC: 9.7 MG/DL
CHLORIDE SERPL-SCNC: 104 MMOL/L
CHOLEST SERPL-MCNC: 223 MG/DL
CO2 SERPL-SCNC: 21 MMOL/L
COLOR: YELLOW
CREAT SERPL-MCNC: 0.89 MG/DL
EGFRCR SERPLBLD CKD-EPI 2021: 92 ML/MIN/1.73M2
EOSINOPHIL # BLD AUTO: 0.12 K/UL
EOSINOPHIL NFR BLD AUTO: 2 %
GLUCOSE QUALITATIVE U: NEGATIVE MG/DL
GLUCOSE SERPL-MCNC: 88 MG/DL
HBV SURFACE AB SER QL: NONREACTIVE
HBV SURFACE AB SERPL IA-ACNC: <3.3 MIU/ML
HCT VFR BLD CALC: 40.3 %
HCV AB SER QL: NONREACTIVE
HCV S/CO RATIO: 0.1 S/CO
HDLC SERPL-MCNC: 73 MG/DL
HGB BLD-MCNC: 13.9 G/DL
HIV1+2 AB SPEC QL IA.RAPID: NONREACTIVE
IMM GRANULOCYTES NFR BLD AUTO: 0.3 %
KETONES URINE: NEGATIVE MG/DL
LDLC SERPL-MCNC: 133 MG/DL
LEUKOCYTE ESTERASE URINE: NEGATIVE
LYMPHOCYTES # BLD AUTO: 2.07 K/UL
LYMPHOCYTES NFR BLD AUTO: 34.3 %
M TB IFN-G BLD-IMP: NEGATIVE
MAN DIFF?: NORMAL
MCHC RBC-ENTMCNC: 32.3 PG
MCHC RBC-ENTMCNC: 34.5 G/DL
MCV RBC AUTO: 93.7 FL
MEV IGG FLD QL IA: 153 AU/ML
MEV IGG+IGM SER-IMP: POSITIVE
MONOCYTES # BLD AUTO: 0.45 K/UL
MONOCYTES NFR BLD AUTO: 7.5 %
MUV AB SER-ACNC: POSITIVE
MUV IGG SER QL IA: 24 AU/ML
N GONORRHOEA RRNA SPEC QL NAA+PROBE: NOT DETECTED
NEUTROPHILS # BLD AUTO: 3.32 K/UL
NEUTROPHILS NFR BLD AUTO: 54.9 %
NITRITE URINE: NEGATIVE
NONHDLC SERPL-MCNC: 151 MG/DL
PH URINE: 5.5
PLATELET # BLD AUTO: 267 K/UL
POTASSIUM SERPL-SCNC: 4.2 MMOL/L
PROT SERPL-MCNC: 7 G/DL
PROTEIN URINE: NEGATIVE MG/DL
QUANTIFERON TB PLUS MITOGEN MINUS NIL: >10 IU/ML
QUANTIFERON TB PLUS NIL: 0.02 IU/ML
QUANTIFERON TB PLUS TB1 MINUS NIL: 0 IU/ML
QUANTIFERON TB PLUS TB2 MINUS NIL: 0 IU/ML
RBC # BLD: 4.3 M/UL
RBC # FLD: 11.9 %
RUBV IGG FLD-ACNC: 1.68 INDEX
RUBV IGG SER-IMP: POSITIVE
SODIUM SERPL-SCNC: 138 MMOL/L
SOURCE AMPLIFICATION: NORMAL
SPECIFIC GRAVITY URINE: 1.02
TRIGL SERPL-MCNC: 98 MG/DL
TSH SERPL-ACNC: 1.86 UIU/ML
UROBILINOGEN URINE: 0.2 MG/DL
VZV AB TITR SER: POSITIVE
VZV IGG SER IF-ACNC: 6.99 S/CO
WBC # FLD AUTO: 6.04 K/UL